# Patient Record
Sex: MALE | Race: WHITE
[De-identification: names, ages, dates, MRNs, and addresses within clinical notes are randomized per-mention and may not be internally consistent; named-entity substitution may affect disease eponyms.]

---

## 2018-07-20 ENCOUNTER — HOSPITAL ENCOUNTER (OUTPATIENT)
Dept: HOSPITAL 74 - JASU-ENDO | Age: 58
Discharge: HOME | End: 2018-07-20
Attending: INTERNAL MEDICINE
Payer: COMMERCIAL

## 2018-07-20 VITALS — HEART RATE: 53 BPM | TEMPERATURE: 98.9 F | SYSTOLIC BLOOD PRESSURE: 131 MMHG | DIASTOLIC BLOOD PRESSURE: 89 MMHG

## 2018-07-20 VITALS — BODY MASS INDEX: 26.6 KG/M2

## 2018-07-20 DIAGNOSIS — K31.7: ICD-10-CM

## 2018-07-20 DIAGNOSIS — Q85.8: Primary | ICD-10-CM

## 2018-07-20 PROCEDURE — 0DB68ZX EXCISION OF STOMACH, VIA NATURAL OR ARTIFICIAL OPENING ENDOSCOPIC, DIAGNOSTIC: ICD-10-PCS | Performed by: INTERNAL MEDICINE

## 2018-07-23 NOTE — PATH
Surgical Pathology Report



Patient Name:  EL ALONSO

Accession #:  [id number]

[facility]. Rec. #:  [id number]                                                        

   /Age/Gender:  1960 (Age: 57) / M

Account:  [id number]                                                          

             Location: [facility]-ENDOSCOPY

Taken:  2018

Received:  2018

Reported:  2018

Physicians:  Quincy Santos M.D.

  



Specimen(s) Received

A: BX POLYP  DUODENUM HAT SHAPE 

B: BX DUODENUM AND BULB  POLYP 2ND PORTION 

C: BX ANTRUM 

D: BX OF FUNDUS AND BODY 





Clinical History

Gastric polyps

Postoperative diagnosis: Duodenal polyp, gastric polyps, Peutz-Jegher's

Syndrome, innumerable gastric polyps







Final Diagnosis

A. DUODENUM, POLYP, POLYPECTOMY:

DUODENAL HAMARTOUS POLYP COMPATIBLE WITH PEUTZ JEGHER'S POLYP.

POLYPOID GASTRIC MUCOSA WITH FOVEOLAR HYPERPLASIA.



B. DUODENUM, SECOND PORTION AND BULB, BIOPSY:

DUODENAL MUCOSA WITH MILD ACUTE AND CHRONIC DUODENITIS.



C. STOMACH, ANTRUM, BIOPSY:

GASTRIC ANTRAL MUCOSA WITHOUT SIGNIFICANT PATHOLOGIC FINDINGS.

IMMUNOHISTOCHEMICAL STAIN FOR H. PYLORI IS NEGATIVE.



D. STOMACH, FUNDUS AND BODY, POLYP, POLYPECTOMY:

HAMARTOUS POLYP COMPATIBLE WITH PEUTZ JEGHER'S POLYP.

FUNDIC GLAND POLYP(S).

IMMUNOHISTOCHEMICAL STAIN FOR H. PYLORI IS NEGATIVE.



Comment: History of Peutz-Jegher's Syndrome noted. No dysplasia identified.









***Electronically Signed***

Gisselle Hadley M.D.





Gross Description

A. Received in formalin, labeled "polyp from duodenum" are 2 tan, irregular to

polypoid portions of soft tissue measuring 0.4 and 0.7 cm. in greatest

dimension. The specimens are submitted in toto in one cassette.



B.  Received in formalin, labeled "biopsy second portion of duodenum and bulb"

are 3 tan, irregular portions of soft tissue ranging from 0.3-0.4 cm. in

greatest dimension. The specimens are submitted in toto in one cassette.



C.  Received in formalin, labeled "biopsy antrum" are 2 tan, irregular portions

of soft tissue measuring 0.4 and 0.6 cm. in greatest dimension. The specimens

are submitted in toto in one cassette.



D.  Received in formalin, labeled "polyp gastric fundus and body" are 8 tan,

irregular to polypoid portions of soft tissue ranging from 0.2-0.7 cm. in

greatest dimension. The specimens are submitted in toto in one cassette.

## 2019-08-23 ENCOUNTER — HOSPITAL ENCOUNTER (OUTPATIENT)
Dept: HOSPITAL 74 - JASU-ENDO | Age: 59
Discharge: HOME | End: 2019-08-23
Attending: INTERNAL MEDICINE
Payer: COMMERCIAL

## 2019-08-23 VITALS — BODY MASS INDEX: 27.6 KG/M2

## 2019-08-23 VITALS — SYSTOLIC BLOOD PRESSURE: 129 MMHG | DIASTOLIC BLOOD PRESSURE: 73 MMHG

## 2019-08-23 VITALS — TEMPERATURE: 98.3 F

## 2019-08-23 VITALS — HEART RATE: 58 BPM

## 2019-08-23 DIAGNOSIS — Q85.8: Primary | ICD-10-CM

## 2019-08-23 PROCEDURE — 3E0G8GC INTRODUCTION OF OTHER THERAPEUTIC SUBSTANCE INTO UPPER GI, VIA NATURAL OR ARTIFICIAL OPENING ENDOSCOPIC: ICD-10-PCS | Performed by: INTERNAL MEDICINE

## 2019-08-23 PROCEDURE — 0DB68ZX EXCISION OF STOMACH, VIA NATURAL OR ARTIFICIAL OPENING ENDOSCOPIC, DIAGNOSTIC: ICD-10-PCS | Performed by: INTERNAL MEDICINE

## 2019-08-23 PROCEDURE — 0DB98ZX EXCISION OF DUODENUM, VIA NATURAL OR ARTIFICIAL OPENING ENDOSCOPIC, DIAGNOSTIC: ICD-10-PCS | Performed by: INTERNAL MEDICINE

## 2019-08-28 NOTE — PATH
Surgical Pathology Report



Patient Name:  EL ALONSO

Accession #:  [id number]

[facility]. Rec. #:  [id number]                                                        

   /Age/Gender:  1960 (Age: 59) / M

Account:  [id number]                                                          

             Location: [facility]-ENDOSCOPY

Taken:  2019

Received:  2019

Reported:  2019

Physicians:  Quincy Santos M.D.

  



Specimen(s) Received

A: DUODENUM 

B: GASTRIC 





Clinical History

Multiple gastric polyps, Peutz-Jeghers syndrome

Postoperative diagnosis: Duodenal and gastric polyps







Final Diagnosis

A. DUODENUM POLYPS, BIOPSY:

DUODENUM MUCOSA WITH DUODENAL HAMARTOMATOUS POLYP.



B. GASTRIC POLYP, BIOPSY: 

GASTRIC HAMARTOMATOUS POLYP.

SEPARATE TWO FRAGMENTS OF FUNDIC GLAND POLYP.

IMMUNOSTAIN FOR H. PYLORI IS NEGATIVE.

NEGATIVE FOR INTESTINAL METAPLASIA.



Note: History of Peutz-Jeghers syndrome noted.





***Electronically Signed***

Jesús Batres M.D.





Gross Description

A.  Received in formalin, labeled "polyps duodenum" are 3 tan, irregular

portions of soft tissue ranging in size from 0.2-1 cm. in greatest dimension.

The base of the largest polyp is inked in black and then serially sectioned. The

second largest polyp is bisected. The specimens are submitted in toto in 3

cassettes as follows: 1- largest polyp, serially sectioned, 2- second largest

polyp, bisected, 3- smaller polyp.



B.  Received in formalin, labeled "polyps gastric" are 3 tan, irregular portions

of soft tissue measuring ranging in size from 0.2-0.7 cm. in greatest dimension.

The base of the largest polyp is inked in black and then bisected. The specimens

are submitted in toto in 2 cassettes as follows: 1- largest polyp, bisected, 2-

smaller polyps.

MLSZ/2019



sanml/[email or web address]